# Patient Record
Sex: MALE | NOT HISPANIC OR LATINO | URBAN - METROPOLITAN AREA
[De-identification: names, ages, dates, MRNs, and addresses within clinical notes are randomized per-mention and may not be internally consistent; named-entity substitution may affect disease eponyms.]

---

## 2021-12-22 ENCOUNTER — APPOINTMENT (OUTPATIENT)
Dept: VACCINE CLINIC | Facility: HOSPITAL | Age: 51
End: 2021-12-22

## 2024-07-11 ENCOUNTER — TELEPHONE (OUTPATIENT)
Dept: INTERNAL MEDICINE | Age: 54
End: 2024-07-11

## 2024-07-11 NOTE — TELEPHONE ENCOUNTER
Caller: SHWETA LEVIN    Relationship: Mother    Best call back number:[phone]     What was the call regarding: SHWETA STATES SHE IS A CURRENT PATIENT OF [facility]. HER SON IS KALEB AND SHE WOULD LIKE TO KNOW IF JIMBO MOREIRA  IS ABLE TO SEE HIM ASAP FOR THE NIGHT SWEAT, FATIGUE AND LEG CRAMPING HE IS HAVING. HUB DID OFFER NEXT AVAILABLE WITH [facility] MORE[facility]A AS WELL AS THE OTHER PROVIDER BUT SHWETA IS INSISTING ON ASKING [facility]IMBO MOREIRA TO SEE HIM SOONER    PLEASE CALL AND ADVISE

## 2024-07-12 ENCOUNTER — OFFICE VISIT (OUTPATIENT)
Dept: INTERNAL MEDICINE | Age: 54
End: 2024-07-12

## 2024-07-12 VITALS
SYSTOLIC BLOOD PRESSURE: 122 MMHG | BODY MASS INDEX: 22.82 KG/M2 | RESPIRATION RATE: 16 BRPM | DIASTOLIC BLOOD PRESSURE: 94 MMHG | OXYGEN SATURATION: 99 % | HEIGHT: 71 IN | HEART RATE: 100 BPM | TEMPERATURE: 97 F | WEIGHT: 163 LBS
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
## 2024-07-12 DIAGNOSIS — Z12.5 SCREENING PSA (PROSTATE SPECIFIC ANTIGEN): ICD-10-CM

## 2024-07-12 DIAGNOSIS — Z12.11 COLON CANCER SCREENING: ICD-10-CM

## 2024-07-12 DIAGNOSIS — Z23 TETANUS TOXOID INOCULATION: ICD-10-CM

## 2024-07-12 DIAGNOSIS — R07.9 CHEST PAIN, UNSPECIFIED TYPE: Chronic | ICD-10-CM

## 2024-07-12 DIAGNOSIS — U07.1 COVID-19 VIRUS INFECTION: Chronic | ICD-10-CM

## 2024-07-12 DIAGNOSIS — R61 UNEXPLAINED NIGHT SWEATS: Primary | Chronic | ICD-10-CM

## 2024-07-12 DIAGNOSIS — S70.361A INSECT BITE OF RIGHT THIGH, INITIAL ENCOUNTER: ICD-10-CM

## 2024-07-12 PROBLEM — Z00.01 ENCOUNTER FOR ROUTINE ADULT HEALTH EXAMINATION WITH ABNORMAL FINDINGS: Status: ACTIVE | Noted: 2024-07-12

## 2024-07-12 PROBLEM — R20.2 NUMBNESS AND TINGLING IN LEFT ARM: Status: ACTIVE | Noted: 2024-07-12

## 2024-07-12 PROBLEM — R20.0 NUMBNESS AND TINGLING IN LEFT ARM: Status: ACTIVE | Noted: 2024-07-12

## 2024-07-12 LAB
EXPIRATION DATE: NORMAL
FLUAV AG UPPER RESP QL IA.RAPID: NOT DETECTED
FLUBV AG UPPER RESP QL IA.RAPID: NOT DETECTED
INTERNAL CONTROL: NORMAL
Lab: NORMAL
SARS-COV-2 AG UPPER RESP QL IA.RAPID: NOT DETECTED

## 2024-07-12 NOTE — LETTER
[facility]  Vaccine Consent Form    Patient Name:  Brock Cortez  Patient :  1970     Vaccine(s) Ordered    Tdap Vaccine Greater Than or Equal To 8yo IM        Screening Checklist  The following questions should be completed prior to vaccination. If you answer “yes” to any question, it does not necessarily mean you should not be vaccinated. It just means we may need to clarify or ask more questions. If a question is unclear, please ask your healthcare provider to explain it.    Yes No   Any fever or moderate to severe illness today (mild illness and/or antibiotic treatment are not contraindications)?     Do you have a history of a serious reaction to any previous vaccinations, such as anaphylaxis, encephalopathy within 7 days, Guillain-[place] syndrome within 6 weeks, seizure?     Have you received any live vaccine(s) (e.g MMR, WALLY) or any other vaccines in the last month (to ensure duplicate doses aren't given)?     Do you have an anaphylactic allergy to latex (DTaP, DTaP-IPV, Hep A, Hep B, MenB, RV, Td, Tdap), baker’s yeast (Hep B, HPV), polysorbates (RSV, nirsevimab, PCV 20, Rotavirrus, Tdap, Shingrix), or gelatin (WALLY, MMR)?     Do you have an anaphylactic allergy to neomycin (Rabies, WALLY, MMR, IPV, Hep A), polymyxin B (IPV), or streptomycin (IPV)?      Any cancer, leukemia, AIDS, or other immune system disorder? (WALLY, MMR, RV)     Do you have a parent, brother, or sister with an immune system problem (if immune competence of vaccine recipient clinically verified, can proceed)? (MMR, WALLY)     Any recent steroid treatments for >2 weeks, chemotherapy, or radiation treatment? (WALLY, MMR)     Have you received antibody-containing blood transfusions or IVIG in the past 11 months (recommended interval is dependent on product)? (MMR, WALLY)     Have you taken antiviral drugs (acyclovir, famciclovir, valacyclovir for WALLY) in the last 24 or 48 hours, respectively?      Are you pregnant or planning to become pregnant  "within 1 month? (WALLY, MMR, HPV, IPV, MenB, Abrexvy; For Hep B- refer to Engerix-B; For RSV - Abrysvo is indicated for 32-36 weeks of pregnancy from September to January)     For infants, have you ever been told your child has had intussusception or a medical emergency involving obstruction of the intestine (Rotavirus)? If not for an infant, can skip this question.         *Ordering Physicians/APC should be consulted if \"yes\" is checked by the patient or guardian above.  I have received, read, and understand the Vaccine Information Statement (VIS) for each vaccine ordered.  I have considered my or my child's health status as well as the health status of my close contacts.  I have taken the opportunity to discuss my vaccine questions with my or my child's health care provider.   I have requested that the ordered vaccine(s) be given to me or my child.  I understand the benefits and risks of the vaccines.  I understand that I should remain in the clinic for 15 minutes after receiving the vaccine(s).  _________________________________________________________  Signature of Patient or Parent/Legal Guardian ____________________  Date     "

## 2024-07-12 NOTE — PROGRESS NOTES
"    I N T E R N A L  M E D I C I N E    Yovanny Lee PA-C      ENCOUNTER DATE:  07/12/2024    Brock Cortez / 54 y.o. / male    CHIEF COMPLAINT / REASON FOR OFFICE VISIT     Establish Care, Leg Pain (Gets to were it cramping, legs always feel like there stiff /), Fatigue (The worse thing is that all he wants to do is sleep), Night Sweats (The night sweats is every night some night are worse then other.), Chest Pain (Last night numbness that went into his left hand ), and Headache      ASSESSMENT & PLAN     Diagnoses and all orders for this visit:    1. Unexplained night sweats (Primary)  -     Comprehensive Metabolic Panel  -     Lipid Panel With / Chol / HDL Ratio  -     CBC & Differential  -     Hemoglobin A1c  -     TSH+Free T4  -     Urinalysis With Microscopic If Indicated (No Culture) - Urine, Clean Catch  -     PSA Screen  -     Hepatitis C Antibody  -     Testosterone  -     HIV-1 / O / 2 Ag / Antibody  -     QuantiFERON TB Gold; Future    2. Chest pain, unspecified type  Assessment & Plan:  EKG normal.  See specifics under medical tests.    Orders:  -     ECG 12 Lead    3. COVID-19 virus infection  -     POCT SARS-CoV-2 + Flu Antigen CHAPO    4. Insect bite of right thigh, initial encounter  -     Lyme Disease Total Antibody With Reflex to Immunoassay; Future  -     C-reactive protein; Future    5. Tetanus toxoid inoculation  -     Tdap Vaccine Greater Than or Equal To 8yo IM    6. Screening PSA (prostate specific antigen)  -     PSA Screen    7. Colon cancer screening  -     Ambulatory Referral to Gastroenterology         Next Appointment with me:   Return in about 2 weeks (around 7/26/2024) for Next scheduled follow up for Follow-up;.      VITAL SIGNS     Vitals:    07/12/24 0738   BP: 122/94   Pulse: 100   Resp: 16   Temp: 97 °F (36.1 °C)   SpO2: 99%   Weight: 73.9 kg (163 lb)   Height: 180.3 cm (71\")       @BP@  Wt Readings from Last 3 Encounters:   07/12/24 73.9 kg (163 lb)     Body mass index is " "22.73 kg/m².    [unfilled]      MEDICATIONS AT THE TIME OF OFFICE VISIT     No current outpatient medications on file prior to visit.     No current facility-administered medications on file prior to visit.          HISTORY OF PRESENT ILLNESS     Pt is a 55y/o wm with a recent histories of COVID-19 and spider bite to right thigh, who presents with complaint of chest pain, left arm numbness, night-sweats, fatigue, and leg cramping.     He reports having contracted COVID on or about 5 weeks ago. At that time he began experiencing fatigue, headaches, extreme body aches, and and night sweats. Many of the symptoms began to improve 3 weeks ago, except for the fatigue and night sweats which have since returned.     On approximately 4 weeks ago he reports being bitten by what he believed to be a spider while working around an area teaming with brown recluse spiders as well as ticks.  Though he did not visually confirm he believes that the bite was consistent with signs of recluse as it was a \"boil\" at its center, surrounded by a red target. He attempted neosporin for several days. Wound improved greatly after one week, though there is still a written spot at the bite area.     Currently he is still experiencing chest pains, left arm numbness, night sweats, headaches, leg cramps, fatigue, chills and hot-flashes. All of his symptoms except for the leg cramps began immediately after acquiring COVID.  He further admits having recently been exposed to COVID via his girlfriend and is unsure if he is positive once again.    Sweating encompasses the entirety of his body \"from head to toe\", and only occurs overnight. Leg cramping is constant throughout the day, but is worst overnight. Headaches occur intermittently without warning, improved by Tylenol. Fatigue is all the time, nothing improves. Hot and Cold flashes occur throughout the entirety of the day.     He has attempted to increased sleep, Alleve, Ibuprofen, and Tylenol all with " minimal effects.     He denies cough, fever, nausea, vomiting, diarrhea, abdominal pain, abdominal masses, rashes, skin changes, recent travel, or changes to urine or bowels.     He not up to date with age-appropriate colon cancer screening.  He is not up-to-date with hepatitis screening.  He has unknown HIV status.  He is not up-to-date with Tdap inoculation, but he is agreeable to inoculation on today.    Patient Care Team:  Yovanny Lee PA-C as PCP - General (Physician Assistant)    REVIEW OF SYSTEMS     Review of Systems   As per HPI, all other ROS negative      PHYSICAL EXAMINATION     Physical Exam  Vitals and nursing note reviewed.   Constitutional:       General: He is not in acute distress.     Appearance: Normal appearance. He is normal weight. He is not ill-appearing or diaphoretic.   HENT:      Head: Normocephalic and atraumatic.      Right Ear: Tympanic membrane, ear canal and external ear normal. There is no impacted cerumen.      Left Ear: Tympanic membrane, ear canal and external ear normal. There is no impacted cerumen.      Nose: Nose normal.      Mouth/Throat:      Mouth: Mucous membranes are moist.      Pharynx: Oropharynx is clear. No oropharyngeal exudate or posterior oropharyngeal erythema.   Eyes:      Extraocular Movements: Extraocular movements intact.      Conjunctiva/sclera: Conjunctivae normal.      Pupils: Pupils are equal, round, and reactive to light.   Cardiovascular:      Rate and Rhythm: Regular rhythm. Tachycardia present.      Pulses: Normal pulses.      Heart sounds: Normal heart sounds. No murmur heard.     No friction rub. No gallop.   Pulmonary:      Effort: Pulmonary effort is normal.      Breath sounds: Normal breath sounds.   Abdominal:      General: Abdomen is flat. Bowel sounds are increased. There is no distension.      Palpations: Abdomen is soft. There is no mass.      Tenderness: There is no abdominal tenderness. There is no guarding or rebound.      Hernia: No  hernia is present.   Musculoskeletal:      Cervical back: Normal range of motion and neck supple. No rigidity or tenderness.   Lymphadenopathy:      Cervical: No cervical adenopathy.   Skin:     General: Skin is warm and dry.      Capillary Refill: Capillary refill takes less than 2 seconds.      Coloration: Skin is not jaundiced or pale.      Findings: Erythema present. No bruising or rash. Rash is macular. Rash is not crusting, nodular, scaling or vesicular.          Neurological:      General: No focal deficit present.      Mental Status: He is alert and oriented to person, place, and time.   Psychiatric:         Mood and Affect: Mood normal.         Behavior: Behavior normal.         Thought Content: Thought content normal.           REVIEWED DATA     Labs:             Imaging:               Medical Tests:       ECG 12 Lead    Date/Time: 7/12/2024 10:01 AM  Performed by: Yovanny Lee PA-C    Authorized by: Yovanny Lee PA-C  Previous ECG: no previous ECG available  Rhythm: sinus rhythm  Rate: normal  Conduction: conduction normal  ST Segments: ST segments normal  T Waves: T waves normal  QRS axis: normal  Other: no other findings    Clinical impression: normal ECG                Summary of old records / correspondence / consultant report:             Request outside records:

## 2024-07-13 LAB
ALBUMIN SERPL-MCNC: 4.2 G/DL (ref 3.8–4.9)
ALP SERPL-CCNC: 142 IU/L (ref 44–121)
ALT SERPL-CCNC: 23 IU/L (ref 0–44)
APPEARANCE UR: CLEAR
AST SERPL-CCNC: 17 IU/L (ref 0–40)
BACTERIA #/AREA URNS HPF: ABNORMAL /[HPF]
BASOPHILS # BLD AUTO: 0.1 X10E3/UL (ref 0–0.2)
BASOPHILS NFR BLD AUTO: 0 %
BILIRUB SERPL-MCNC: 0.4 MG/DL (ref 0–1.2)
BILIRUB UR QL STRIP: NEGATIVE
BUN SERPL-MCNC: 13 MG/DL (ref 6–24)
BUN/CREAT SERPL: 13 (ref 9–20)
CALCIUM SERPL-MCNC: 10.2 MG/DL (ref 8.7–10.2)
CASTS URNS QL MICRO: ABNORMAL /LPF
CHLORIDE SERPL-SCNC: 97 MMOL/L (ref 96–106)
CHOLEST SERPL-MCNC: 249 MG/DL (ref 100–199)
CHOLEST/HDLC SERPL: 3.8 RATIO (ref 0–5)
CO2 SERPL-SCNC: 22 MMOL/L (ref 20–29)
COLOR UR: YELLOW
CREAT SERPL-MCNC: 1.04 MG/DL (ref 0.76–1.27)
EGFRCR SERPLBLD CKD-EPI 2021: 85 ML/MIN/1.73
EOSINOPHIL # BLD AUTO: 0.1 X10E3/UL (ref 0–0.4)
EOSINOPHIL NFR BLD AUTO: 1 %
EPI CELLS #/AREA URNS HPF: ABNORMAL /HPF (ref 0–10)
ERYTHROCYTE [DISTWIDTH] IN BLOOD BY AUTOMATED COUNT: 12.5 % (ref 11.6–15.4)
GLOBULIN SER CALC-MCNC: 3 G/DL (ref 1.5–4.5)
GLUCOSE SERPL-MCNC: 114 MG/DL (ref 70–99)
GLUCOSE UR QL STRIP: NEGATIVE
HBA1C MFR BLD: 6.2 % (ref 4.8–5.6)
HCT VFR BLD AUTO: 42.1 % (ref 37.5–51)
HCV IGG SERPL QL IA: NON REACTIVE
HDLC SERPL-MCNC: 66 MG/DL
HGB BLD-MCNC: 14.2 G/DL (ref 13–17.7)
HGB UR QL STRIP: ABNORMAL
HIV 1+2 AB+HIV1 P24 AG SERPL QL IA: NON REACTIVE
IMM GRANULOCYTES # BLD AUTO: 0.1 X10E3/UL (ref 0–0.1)
IMM GRANULOCYTES NFR BLD AUTO: 1 %
KETONES UR QL STRIP: NEGATIVE
LDLC SERPL CALC-MCNC: 164 MG/DL (ref 0–99)
LEUKOCYTE ESTERASE UR QL STRIP: NEGATIVE
LYMPHOCYTES # BLD AUTO: 4.8 X10E3/UL (ref 0.7–3.1)
LYMPHOCYTES NFR BLD AUTO: 29 %
MCH RBC QN AUTO: 30.2 PG (ref 26.6–33)
MCHC RBC AUTO-ENTMCNC: 33.7 G/DL (ref 31.5–35.7)
MCV RBC AUTO: 90 FL (ref 79–97)
MICRO URNS: ABNORMAL
MONOCYTES # BLD AUTO: 1.3 X10E3/UL (ref 0.1–0.9)
MONOCYTES NFR BLD AUTO: 8 %
NEUTROPHILS # BLD AUTO: 10.5 X10E3/UL (ref 1.4–7)
NEUTROPHILS NFR BLD AUTO: 61 %
NITRITE UR QL STRIP: NEGATIVE
PH UR STRIP: 5.5 [PH] (ref 5–7.5)
PLATELET # BLD AUTO: 414 X10E3/UL (ref 150–450)
POTASSIUM SERPL-SCNC: 4.8 MMOL/L (ref 3.5–5.2)
PROT SERPL-MCNC: 7.2 G/DL (ref 6–8.5)
PROT UR QL STRIP: ABNORMAL
PSA SERPL-MCNC: 0.5 NG/ML (ref 0–4)
RBC # BLD AUTO: 4.7 X10E6/UL (ref 4.14–5.8)
RBC #/AREA URNS HPF: ABNORMAL /HPF (ref 0–2)
SODIUM SERPL-SCNC: 135 MMOL/L (ref 134–144)
SP GR UR STRIP: 1.03 (ref 1–1.03)
T4 FREE SERPL-MCNC: 1.1 NG/DL (ref 0.82–1.77)
TESTOST SERPL-MCNC: 179 NG/DL (ref 264–916)
TRIGL SERPL-MCNC: 110 MG/DL (ref 0–149)
TSH SERPL DL<=0.005 MIU/L-ACNC: 2.28 UIU/ML (ref 0.45–4.5)
UROBILINOGEN UR STRIP-MCNC: 0.2 MG/DL (ref 0.2–1)
VLDLC SERPL CALC-MCNC: 19 MG/DL (ref 5–40)
WBC # BLD AUTO: 16.9 X10E3/UL (ref 3.4–10.8)
WBC #/AREA URNS HPF: ABNORMAL /HPF (ref 0–5)

## 2024-07-15 ENCOUNTER — TELEPHONE (OUTPATIENT)
Dept: INTERNAL MEDICINE | Age: 54
End: 2024-07-15

## 2024-07-15 NOTE — TELEPHONE ENCOUNTER
Caller: TREE RODRIGUEZ    Relationship: PT'S MOTHER     Best call back number: [phone]    Caller requesting test results:     What test was performed: LABS     When was the test performed: 7/12/24    Where was the test performed: IN OFFICE     Additional notes: PT'S MOTHER REQUESTING TO SPEAK WITH PCP ABOUT LAB RESULTS.

## 2024-07-16 ENCOUNTER — TELEPHONE (OUTPATIENT)
Dept: INTERNAL MEDICINE | Age: 54
End: 2024-07-16

## 2024-07-16 NOTE — TELEPHONE ENCOUNTER
Caller: SHWETA LEVIN    Relationship: Mother    Best call back number:   [phone]    What is the best time to reach you: ANY    Who are you requesting to speak with (clinical staff, provider,  specific staff member): CHENCHO        What was the call regarding: PATIENTS MOTHER IS NEEDING SOME ANSWERS ABOUT LAB WORK THAT HAS CAME BACK. PRETTY SURE IT IS AN INFECTION.  PATIENTS MOTHER HAS SOME QUESTIONS PLEASE CALL.      PATIENTS MOTHER HAS CALLED FOR 2 DAYS WITH NO RESPONSE

## 2024-07-17 DIAGNOSIS — R61 NIGHT SWEATS: Primary | Chronic | ICD-10-CM

## 2024-07-17 DIAGNOSIS — S70.361A INSECT BITE OF RIGHT THIGH, INITIAL ENCOUNTER: ICD-10-CM
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
## 2024-07-17 RX ORDER — DOXYCYCLINE HYCLATE 100 MG
100 TABLET ORAL 2 TIMES DAILY
Qty: 28 TABLET | Refills: 0 | Status: SHIPPED | OUTPATIENT
Start: 2024-07-17 | End: 2024-07-31

## 2024-07-26 ENCOUNTER — OFFICE VISIT (OUTPATIENT)
Dept: INTERNAL MEDICINE | Age: 54
End: 2024-07-26
Payer: MEDICAID

## 2024-07-26 VITALS
HEART RATE: 86 BPM | WEIGHT: 159 LBS | TEMPERATURE: 97 F | SYSTOLIC BLOOD PRESSURE: 124 MMHG | OXYGEN SATURATION: 96 % | BODY MASS INDEX: 22.26 KG/M2 | DIASTOLIC BLOOD PRESSURE: 86 MMHG | HEIGHT: 71 IN

## 2024-07-26 DIAGNOSIS — R73.03 PREDIABETES: Chronic | ICD-10-CM

## 2024-07-26 DIAGNOSIS — E78.5 HYPERLIPIDEMIA, UNSPECIFIED HYPERLIPIDEMIA TYPE: Chronic | ICD-10-CM

## 2024-07-26 DIAGNOSIS — D72.829 LEUKOCYTOSIS, UNSPECIFIED TYPE: Primary | ICD-10-CM
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
## 2024-07-26 DIAGNOSIS — R79.89 LOW TESTOSTERONE: Chronic | ICD-10-CM

## 2024-07-26 DIAGNOSIS — S70.361D TICK BITE OF RIGHT THIGH, SUBSEQUENT ENCOUNTER: ICD-10-CM

## 2024-07-26 LAB
BASOPHILS # BLD AUTO: 0.07 10*3/MM3 (ref 0–0.2)
BASOPHILS NFR BLD AUTO: 0.5 % (ref 0–1.5)
EOSINOPHIL # BLD AUTO: 0.17 10*3/MM3 (ref 0–0.4)
EOSINOPHIL NFR BLD AUTO: 1.1 % (ref 0.3–6.2)
ERYTHROCYTE [DISTWIDTH] IN BLOOD BY AUTOMATED COUNT: 12.9 % (ref 12.3–15.4)
HCT VFR BLD AUTO: 38.1 % (ref 37.5–51)
HGB BLD-MCNC: 12.7 G/DL (ref 13–17.7)
IMM GRANULOCYTES # BLD AUTO: 0.07 10*3/MM3 (ref 0–0.05)
IMM GRANULOCYTES NFR BLD AUTO: 0.5 % (ref 0–0.5)
LYMPHOCYTES # BLD AUTO: 4.68 10*3/MM3 (ref 0.7–3.1)
LYMPHOCYTES NFR BLD AUTO: 30.9 % (ref 19.6–45.3)
MCH RBC QN AUTO: 30.2 PG (ref 26.6–33)
MCHC RBC AUTO-ENTMCNC: 33.3 G/DL (ref 31.5–35.7)
MCV RBC AUTO: 90.7 FL (ref 79–97)
MONOCYTES # BLD AUTO: 0.88 10*3/MM3 (ref 0.1–0.9)
MONOCYTES NFR BLD AUTO: 5.8 % (ref 5–12)
NEUTROPHILS # BLD AUTO: 9.28 10*3/MM3 (ref 1.7–7)
NEUTROPHILS NFR BLD AUTO: 61.2 % (ref 42.7–76)
NRBC BLD AUTO-RTO: 0 /100 WBC (ref 0–0.2)
PLATELET # BLD AUTO: 474 10*3/MM3 (ref 140–450)
RBC # BLD AUTO: 4.2 10*6/MM3 (ref 4.14–5.8)
WBC # BLD AUTO: 15.15 10*3/MM3 (ref 3.4–10.8)

## 2024-07-26 PROCEDURE — 1125F AMNT PAIN NOTED PAIN PRSNT: CPT | Performed by: PHYSICIAN ASSISTANT

## 2024-07-26 PROCEDURE — 99214 OFFICE O/P EST MOD 30 MIN: CPT | Performed by: PHYSICIAN ASSISTANT

## 2024-07-26 NOTE — ASSESSMENT & PLAN NOTE
Low sugar, low carbohydrate, low-fat diet counseled.  Patient encouraged to increase leafy green vegetables and lean meats.  Repeat A1c in 3 months.

## 2024-07-26 NOTE — ASSESSMENT & PLAN NOTE
Lipid abnormalities are newly identified    Plan:  Lipid lowering therapy not prescribed due to patient refusal, lifestyle modifications counseled.    Counseled patient on lifestyle modifications to help control hyperlipidemia.   Cholesterol lowering dietary information shared with patient.    Patient Treatment Goals:   LDL goal is less than 55    Followup in 3 months.

## 2024-07-26 NOTE — PROGRESS NOTES
I N T E R N A L  M E D I C I N E    Yovanny Lee PA-C      ENCOUNTER DATE:  07/26/2024    Brock Cortez / 54 y.o. / male    CHIEF COMPLAINT / REASON FOR OFFICE VISIT     Unexplained night sweats (Patient is not having unexplained night sweats )      ASSESSMENT & PLAN   Diagnoses and all orders for this visit:    1. Leukocytosis, unspecified type (Primary)  Assessment & Plan:  Repeat CBC on today.    Orders:  -     CBC & Differential    2. Prediabetes  Assessment & Plan:  Low sugar, low carbohydrate, low-fat diet counseled.  Patient encouraged to increase leafy green vegetables and lean meats.  Repeat A1c in 3 months.      3. Hyperlipidemia, unspecified hyperlipidemia type  Overview:  7/26/2024- patient declines statin therapy at this point in favor of dietary changes.    Assessment & Plan:   Lipid abnormalities are newly identified    Plan:  Lipid lowering therapy not prescribed due to patient refusal, lifestyle modifications counseled.    Counseled patient on lifestyle modifications to help control hyperlipidemia.   Cholesterol lowering dietary information shared with patient.    Patient Treatment Goals:   LDL goal is less than 55    Followup in 3 months.      4. Tick bite of right thigh, subsequent encounter  Assessment & Plan:  Patient to  doxycycline therapy from the pharmacy and complete course of medication.    Lyme titer on today.    Orders:  -     Lyme Disease Total Antibody With Reflex to Immunoassay; Future    5. Low testosterone  Assessment & Plan:  Improve diet and increase physical exercise to include weight training counseled.           Next Appointment with me: Visit date not found    Return in about 3 months (around 10/26/2024) for Recheck Cholesterol.        VITAL SIGNS     Vitals:    07/26/24 0902   BP: 124/86   BP Location: Left arm   Patient Position: Sitting   Cuff Size: Adult   Pulse: 86   Temp: 97 °F (36.1 °C)   TempSrc: Temporal   SpO2: 96%   Weight: 72.1 kg (159 lb)   Height:  "180.3 cm (70.98\")       @BP@  Wt Readings from Last 3 Encounters:   07/26/24 72.1 kg (159 lb)   07/12/24 73.9 kg (163 lb)     Body mass index is 22.19 kg/m².      MEDICATIONS AT THE TIME OF OFFICE VISIT     Current Outpatient Medications on File Prior to Visit   Medication Sig    doxycycline (VIBRAMYICN) 100 MG tablet Take 1 tablet by mouth 2 (Two) Times a Day for 14 days.     No current facility-administered medications on file prior to visit.          HISTORY OF PRESENT ILLNESS     Pt is a 53y/o wm with a recent histories of COVID-19 and spider bite to right thigh, who presents for follow-up to visit on 7/12/2024.     Recent history of COVID diagnosed on or about the week of Sada 3, 2024. At that time he began experiencing fatigue, headaches, extreme body aches, and and night sweats. Many of the symptoms began to improve after 2 weeks, except for the fatigue and night sweats which later returned.     At the last visit he also presented with what he believed to be a spider bite to his inner thigh, although he did not see what bit him.  At that time he did present with a bite surrounded by a red inflamed area. He still admits a \"tiny red spot\" at the area where he was bitten. Admits resolution of all previous systemic complaints or complaints of pain.     He was prescribed doxycycline 100 mg twice daily, but states that he was never able to receive it from the pharmacy due to the national [employer] systems outage. He does feel like he may still pick the medication up and take it in case he was bitten by a tick. Lyme titer to be collected today.  He declined to complete chest x-ray as ordered at that time.    Feels recovered physically on today denies all pains or constitutional symptoms.    Results of new patient labs on 7/12/2024 demonstrated elevated WBC which will be repeated today, now that he is improved. Hyperlipidemia demonstrated and patient declines statin therapy on today, in favor of dietary changes.  " Hemoglobin A1c of 6.2 denoting prediabetes.    He not up to date with age-appropriate colon cancer screening.  Referral sent on 7/12/2024.      REVIEW OF SYSTEMS     Review of Systems   All other systems reviewed and are negative.     As per HPI.      PHYSICAL EXAMINATION     Physical Exam  Vitals and nursing note reviewed.   Constitutional:       Appearance: Normal appearance.   Cardiovascular:      Rate and Rhythm: Normal rate and regular rhythm.      Pulses: Normal pulses.      Heart sounds: Normal heart sounds.   Pulmonary:      Effort: Pulmonary effort is normal.      Breath sounds: Normal breath sounds.   Neurological:      Mental Status: He is alert.             REVIEWED DATA     Labs:           Imaging:           Medical Tests:           Summary of old records / correspondence / consultant report:           Request outside records:

## 2024-07-26 NOTE — ASSESSMENT & PLAN NOTE
Patient to  doxycycline therapy from the pharmacy and complete course of medication.    Lyme titer on today.

## 2024-07-30 DIAGNOSIS — D64.9 ANEMIA, UNSPECIFIED TYPE: Primary | ICD-10-CM

## 2024-08-15 ENCOUNTER — TELEPHONE (OUTPATIENT)
Dept: INTERNAL MEDICINE | Age: 54
End: 2024-08-15
Payer: MEDICAID

## 2024-08-22 ENCOUNTER — TELEPHONE (OUTPATIENT)
Dept: INTERNAL MEDICINE | Age: 54
End: 2024-08-22
Payer: MEDICAID

## 2024-08-22 NOTE — TELEPHONE ENCOUNTER
[facility] FOR [facility] TO CALL AND SCHEDULE LAB APPT. 1WK PRIOR TO DRAshlyn VISIT.     PLEASE SCHEDULE THIS APPT. WHEN PT CALLS.

## 2024-10-25 ENCOUNTER — OFFICE VISIT (OUTPATIENT)
Dept: INTERNAL MEDICINE | Age: 54
End: 2024-10-25

## 2024-10-25 VITALS
HEIGHT: 71 IN | WEIGHT: 164 LBS | DIASTOLIC BLOOD PRESSURE: 104 MMHG | SYSTOLIC BLOOD PRESSURE: 140 MMHG | HEART RATE: 85 BPM | RESPIRATION RATE: 17 BRPM | BODY MASS INDEX: 22.96 KG/M2 | TEMPERATURE: 96.4 F | OXYGEN SATURATION: 97 %

## 2024-10-25 DIAGNOSIS — R31.1 BENIGN ESSENTIAL MICROSCOPIC HEMATURIA: ICD-10-CM

## 2024-10-25 DIAGNOSIS — D72.829 LEUKOCYTOSIS, UNSPECIFIED TYPE: Primary | ICD-10-CM

## 2024-10-25 DIAGNOSIS — Z91.199 NONCOMPLIANCE: ICD-10-CM

## 2024-10-25 DIAGNOSIS — D64.9 ANEMIA, UNSPECIFIED TYPE: ICD-10-CM

## 2024-10-25 DIAGNOSIS — R73.03 PREDIABETES: Chronic | ICD-10-CM

## 2024-10-25 NOTE — PROGRESS NOTES
SARAH QUIÑONES PA-C                  I  N  T  E  R  N  A  L    M  E  D  I  C  I  N  E         ENCOUNTER DATE:  10/25/2024    Brock Cortez / 54 y.o. / male    OFFICE VISIT ENCOUNTER       CHIEF COMPLAINT / REASON FOR OFFICE VISIT     Leukocytosis      ASSESSMENT & PLAN     Problem List Items Addressed This Visit       Leukocytosis - Primary    Relevant Orders    CBC & Differential    Prediabetes (Chronic)    Relevant Orders    Hemoglobin A1c    Anemia    Relevant Orders    CBC & Differential    Ferritin    Iron Profile    Reticulocytes    Benign essential microscopic hematuria    Relevant Orders    Urinalysis With Microscopic If Indicated (No Culture) - Urine, Clean Catch    Noncompliance    Relevant Orders    Ambulatory Referral to Social Care Services (Amb Case Mgmt)     Orders Placed This Encounter   Procedures    Hemoglobin A1c     Order Specific Question:   Release to patient     Answer:   Routine Release [[id number]]    Urinalysis With Microscopic If Indicated (No Culture) - Urine, Clean Catch     Order Specific Question:   Release to patient     Answer:   Routine Release [[id number]]    Ferritin     Order Specific Question:   Release to patient     Answer:   Routine Release [[id number]]    Iron Profile     Order Specific Question:   Release to patient     Answer:   Routine Release [[id number]]    Reticulocytes     Order Specific Question:   Release to patient     Answer:   Routine Release [[id number]]    Ambulatory Referral to Social Care Services (Amb Case Mgmt)     Referral Priority:   Routine     Referral Type:   Social Care Notification     Referral Reason:   Specialty Services Required     Requested Specialty:   Population Health     Number of Visits Requested:   1    CBC & Differential     Order Specific Question:   Manual Differential     Answer:   No     Order Specific Question:   Release to patient     Answer:   Routine Release [[id number]]     No orders of the defined  "types were placed in this encounter.      SUMMARY/DISCUSSION  Labs ordered for today.  Patient becomes angry following discussion about his noncompliance with the therapeutic plan, and leaves the office prior to completion of visit.   Patient's mother remains behind and is invited to have patient return for labs at his own convenience if he changes his mind.       Next Appointment:   Return for Patient to return to complate labs when less upset. .      VITAL SIGNS     Vitals:    10/25/24 0902 10/25/24 0911   BP: 138/100 (!) 140/104   BP Location: Left arm Right arm   Patient Position: Sitting Sitting   Cuff Size: Adult Adult   Pulse: 85    Resp: 17    Temp: 96.4 °F (35.8 °C)    TempSrc: Temporal    SpO2: 97%    Weight: 74.4 kg (164 lb)    Height: 180.3 cm (70.98\")        BP Readings from Last 3 Encounters:   10/25/24 (!) 140/104   07/26/24 124/86   07/12/24 122/94     Wt Readings from Last 3 Encounters:   10/25/24 74.4 kg (164 lb)   07/26/24 72.1 kg (159 lb)   07/12/24 73.9 kg (163 lb)     Body mass index is 22.88 kg/m².         No data to display                   MEDICATIONS AT THE TIME OF OFFICE VISIT     No current outpatient medications on file prior to visit.     No current facility-administered medications on file prior to visit.          HISTORY OF PRESENT ILLNESS     Pt is a 53y/o wm with leukocytosis, prediabetes, hyperlipidemia, anemia, elevated blood pressure, and low testosterone who presents for follow-up.    Leukocytosis: On new patient labs WBC was 16.9 and later improved to 15.152 weeks later.  At that time patient did report an insect bite to the inside thigh but was avoidant of starting antibiotic therapies for definitive Lyme disease treatment.  He has since completed antibiotic therapy as prescribed. Patient agrees to have repeat levels on today.    Anemia: Hgb of 12.7 on 7/26/24. Repeat CBC with Anemia panel on today.     Hypertension: Elevated blood pressure readings over last 2 visits.  In " "July 2024 BP was only mildly elevated at 124/86 on today BP was elevated at 138/101 140/104 in opposite arms. Patient believes that his BP is only high in office as he does not like medical facilities of any type. He is not currently agreeable to start anti-hypertensive therapy despite being counseled about potential multi-organ damage that he is at risk for. He is offered to return with BP readings obtained at home (outside of medical facilities).     Hyperlipidemia: LDL of 164 and total cholesterol of 249 in July 2024.  Patient agreed to decrease ingestion of high cholesterol foods and work on increasing exercise compliance.    Prediabetes: A1c of 6.2 on 7/12/2024.  Decreased dietary sugar intake and weight loss counseled.    Hematuria: Urinalysis was positive for RBCs of 11-30 and July 2024.  Agrees to repeat urinalysis on today.    Low testosterone: Testosterone level of 179 on 7/12/2024.     Tobacco Dependency: 1 pack daily for 30 pack-years.     Non-Compliance:  The patient admits that he \"hates\" to go to the doctor, and states that he would not be here today if his mother had not made him return. His mother also expresses the patient's fear of not having medical insurance. He will be referred to Social Work for assistance in signing up for insurance.       Patient Care Team:  Yovanny Lee PA-C as PCP - General (Physician Assistant)    REVIEW OF SYSTEMS     Review of Systems   As Per HPI.  All other systems negative.     PHYSICAL EXAMINATION     Physical Exam  Vitals and nursing note reviewed.   Constitutional:       Appearance: Normal appearance. He is normal weight.   Neurological:      General: No focal deficit present.      Mental Status: He is alert.   Psychiatric:         Attention and Perception: He is inattentive.         Mood and Affect: Affect is angry.         Behavior: Behavior is uncooperative, agitated and aggressive.             REVIEWED DATA     Labs:     Lab Results   Component Value " "Date     07/12/2024    K 4.8 07/12/2024    CALCIUM 10.2 07/12/2024    AST 17 07/12/2024    ALT 23 07/12/2024    BUN 13 07/12/2024    CREATININE 1.04 07/12/2024    EGFRRESULT 85 07/12/2024     Lab Results   Component Value Date    HGBA1C 6.2 (H) 07/12/2024     Lab Results   Component Value Date     (H) 07/12/2024    HDL 66 07/12/2024    TRIG 110 07/12/2024     Lab Results   Component Value Date    TSH 2.280 07/12/2024    FREET4 1.10 07/12/2024     Lab Results   Component Value Date    WBC 15.15 (H) 07/26/2024    HGB 12.7 (L) 07/26/2024     (H) 07/26/2024   No results found for: \"MALBCRERATIO\"          Imaging:               Medical Tests:               Summary of old records / correspondence / consultant report:             Request outside records:       "

## 2024-10-27 PROBLEM — R31.1 BENIGN ESSENTIAL MICROSCOPIC HEMATURIA: Status: ACTIVE | Noted: 2024-10-27

## 2024-10-27 PROBLEM — Z91.199 NONCOMPLIANCE: Status: ACTIVE | Noted: 2024-10-27

## 2024-10-27 PROBLEM — D64.9 ANEMIA: Status: ACTIVE | Noted: 2024-10-27

## 2024-10-28 ENCOUNTER — REFERRAL TRIAGE (OUTPATIENT)
Age: 54
End: 2024-10-28

## 2024-10-30 ENCOUNTER — PATIENT OUTREACH (OUTPATIENT)
Age: 54
End: 2024-10-30